# Patient Record
Sex: MALE | Race: WHITE
[De-identification: names, ages, dates, MRNs, and addresses within clinical notes are randomized per-mention and may not be internally consistent; named-entity substitution may affect disease eponyms.]

---

## 2019-06-26 ENCOUNTER — APPOINTMENT (OUTPATIENT)
Dept: CARDIOLOGY | Facility: CLINIC | Age: 69
End: 2019-06-26
Payer: COMMERCIAL

## 2019-06-26 VITALS
BODY MASS INDEX: 36.12 KG/M2 | HEART RATE: 18 BPM | WEIGHT: 258 LBS | OXYGEN SATURATION: 96 % | TEMPERATURE: 98.7 F | RESPIRATION RATE: 16 BRPM | HEIGHT: 71 IN

## 2019-06-26 PROCEDURE — 99214 OFFICE O/P EST MOD 30 MIN: CPT | Mod: 25

## 2019-06-26 PROCEDURE — 93000 ELECTROCARDIOGRAM COMPLETE: CPT

## 2019-07-08 ENCOUNTER — RECORD ABSTRACTING (OUTPATIENT)
Age: 69
End: 2019-07-08

## 2019-07-08 RX ORDER — ASPIRIN 81 MG/1
81 TABLET, CHEWABLE ORAL
Qty: 15 | Refills: 0 | Status: ACTIVE | COMMUNITY

## 2019-12-05 ENCOUNTER — NON-APPOINTMENT (OUTPATIENT)
Age: 69
End: 2019-12-05

## 2019-12-05 ENCOUNTER — APPOINTMENT (OUTPATIENT)
Dept: CARDIOLOGY | Facility: CLINIC | Age: 69
End: 2019-12-05
Payer: COMMERCIAL

## 2019-12-05 VITALS — OXYGEN SATURATION: 97 % | BODY MASS INDEX: 35.98 KG/M2 | HEART RATE: 80 BPM | HEIGHT: 71 IN | WEIGHT: 257 LBS

## 2019-12-05 VITALS — SYSTOLIC BLOOD PRESSURE: 198 MMHG | DIASTOLIC BLOOD PRESSURE: 96 MMHG

## 2019-12-05 PROCEDURE — 93000 ELECTROCARDIOGRAM COMPLETE: CPT

## 2019-12-05 PROCEDURE — 99214 OFFICE O/P EST MOD 30 MIN: CPT

## 2019-12-05 RX ORDER — DILTIAZEM HYDROCHLORIDE 180 MG/1
180 CAPSULE, EXTENDED RELEASE ORAL
Qty: 90 | Refills: 3 | Status: ACTIVE | COMMUNITY
Start: 2019-12-05 | End: 1900-01-01

## 2019-12-05 RX ORDER — DILTIAZEM HYDROCHLORIDE 180 MG/1
180 CAPSULE, COATED, EXTENDED RELEASE ORAL DAILY
Refills: 0 | Status: DISCONTINUED | COMMUNITY
End: 2019-12-05

## 2019-12-05 RX ORDER — FINASTERIDE 5 MG/1
5 TABLET, FILM COATED ORAL
Refills: 0 | Status: DISCONTINUED | COMMUNITY
End: 2019-12-05

## 2019-12-06 NOTE — DISCUSSION/SUMMARY
[Moderate Aortic Stenosis] : moderate aortic stenosis [FreeTextEntry1] : Echo will be ordered to evaluate valvular disease.

## 2019-12-06 NOTE — HISTORY OF PRESENT ILLNESS
[FreeTextEntry1] : is a 69-year-old patient who presents for cardiac followup. Patient is a former patient of Dr. Yoandy Castellano. He has a history of aortic stenosis carotid artery stenosis diabetes mellitus hypertension obesity hyperlipidemia and shortness of breath. Patient denies chest pain shortness of breath at this time.

## 2019-12-06 NOTE — PHYSICAL EXAM
[General Appearance - Well Developed] : well developed [Normal Appearance] : normal appearance [General Appearance - Well Nourished] : well nourished [No Deformities] : no deformities [Well Groomed] : well groomed [Normal Conjunctiva] : the conjunctiva exhibited no abnormalities [General Appearance - In No Acute Distress] : no acute distress [Eyelids - No Xanthelasma] : the eyelids demonstrated no xanthelasmas [Normal Oral Mucosa] : normal oral mucosa [No Oral Cyanosis] : no oral cyanosis [No Oral Pallor] : no oral pallor [Normal Jugular Venous V Waves Present] : normal jugular venous V waves present [Normal Jugular Venous A Waves Present] : normal jugular venous A waves present [No Jugular Venous [place] A Waves] : no jugular venous [place] A waves [Exaggerated Use Of Accessory Muscles For Inspiration] : no accessory muscle use [Respiration, Rhythm And Depth] : normal respiratory rhythm and effort [Heart Rate And Rhythm] : heart rate and rhythm were normal [Auscultation Breath Sounds / Voice Sounds] : lungs were clear to auscultation bilaterally [Heart Sounds] : normal S1 and S2 [Murmurs] : no murmurs present [Abdomen Soft] : soft [Abdomen Tenderness] : non-tender [Abnormal Walk] : normal gait [Abdomen Mass (___ Cm)] : no abdominal mass palpated [Nail Clubbing] : no clubbing of the fingernails [Gait - Sufficient For Exercise Testing] : the gait was sufficient for exercise testing [Cyanosis, Localized] : no localized cyanosis [Petechial Hemorrhages (___cm)] : no petechial hemorrhages [Skin Color & Pigmentation] : normal skin color and pigmentation [] : no rash [No Venous Stasis] : no venous stasis [No Skin Ulcers] : no skin ulcer [Skin Lesions] : no skin lesions [Oriented To Time, Place, And Person] : oriented to person, place, and time [No Xanthoma] : no  xanthoma was observed [Mood] : the mood was normal [Affect] : the affect was normal [No Anxiety] : not feeling anxious

## 2019-12-24 ENCOUNTER — APPOINTMENT (OUTPATIENT)
Dept: CARDIOLOGY | Facility: CLINIC | Age: 69
End: 2019-12-24
Payer: COMMERCIAL

## 2019-12-24 PROCEDURE — 93306 TTE W/DOPPLER COMPLETE: CPT

## 2020-01-06 ENCOUNTER — APPOINTMENT (OUTPATIENT)
Dept: CARDIOLOGY | Facility: CLINIC | Age: 70
End: 2020-01-06
Payer: COMMERCIAL

## 2020-01-06 VITALS
HEIGHT: 61 IN | BODY MASS INDEX: 48.52 KG/M2 | WEIGHT: 257 LBS | OXYGEN SATURATION: 96 % | HEART RATE: 58 BPM | DIASTOLIC BLOOD PRESSURE: 88 MMHG | SYSTOLIC BLOOD PRESSURE: 160 MMHG

## 2020-01-06 DIAGNOSIS — I10 ESSENTIAL (PRIMARY) HYPERTENSION: ICD-10-CM

## 2020-01-06 PROCEDURE — 99214 OFFICE O/P EST MOD 30 MIN: CPT | Mod: 25

## 2020-01-06 PROCEDURE — 93000 ELECTROCARDIOGRAM COMPLETE: CPT

## 2020-01-06 NOTE — REASON FOR VISIT
[Follow-Up - Clinic] : a clinic follow-up of [Hypertension] : hypertension [Hyperlipidemia] : hyperlipidemia [Medication Management] : Medication management

## 2020-01-07 DIAGNOSIS — Z00.00 ENCOUNTER FOR GENERAL ADULT MEDICAL EXAMINATION W/OUT ABNORMAL FINDINGS: ICD-10-CM

## 2020-01-07 LAB
25(OH)D3 SERPL-MCNC: 17.4 NG/ML
ALBUMIN SERPL ELPH-MCNC: 4.6 G/DL
ALP BLD-CCNC: 54 U/L
ALT SERPL-CCNC: 48 U/L
ANION GAP SERPL CALC-SCNC: 16 MMOL/L
AST SERPL-CCNC: 42 U/L
BASOPHILS # BLD AUTO: 0.02 K/UL
BASOPHILS NFR BLD AUTO: 0.3 %
BILIRUB SERPL-MCNC: 0.6 MG/DL
BUN SERPL-MCNC: 12 MG/DL
CALCIUM SERPL-MCNC: 10.2 MG/DL
CHLORIDE SERPL-SCNC: 100 MMOL/L
CHOLEST SERPL-MCNC: 233 MG/DL
CHOLEST/HDLC SERPL: 3.8 RATIO
CK SERPL-CCNC: 148 U/L
CO2 SERPL-SCNC: 24 MMOL/L
CREAT SERPL-MCNC: 0.83 MG/DL
EOSINOPHIL # BLD AUTO: 0.21 K/UL
EOSINOPHIL NFR BLD AUTO: 3.3 %
ESTIMATED AVERAGE GLUCOSE: 134 MG/DL
GLUCOSE SERPL-MCNC: 172 MG/DL
HBA1C MFR BLD HPLC: 6.3 %
HCT VFR BLD CALC: 41.5 %
HDLC SERPL-MCNC: 61 MG/DL
HGB BLD-MCNC: 13.8 G/DL
IMM GRANULOCYTES NFR BLD AUTO: 0.3 %
LDLC SERPL CALC-MCNC: 151 MG/DL
LYMPHOCYTES # BLD AUTO: 1.78 K/UL
LYMPHOCYTES NFR BLD AUTO: 27.9 %
MAGNESIUM SERPL-MCNC: 1.7 MG/DL
MAN DIFF?: NORMAL
MCHC RBC-ENTMCNC: 32.9 PG
MCHC RBC-ENTMCNC: 33.3 GM/DL
MCV RBC AUTO: 99 FL
MONOCYTES # BLD AUTO: 0.61 K/UL
MONOCYTES NFR BLD AUTO: 9.5 %
NEUTROPHILS # BLD AUTO: 3.75 K/UL
NEUTROPHILS NFR BLD AUTO: 58.7 %
PLATELET # BLD AUTO: 183 K/UL
POTASSIUM SERPL-SCNC: 4.8 MMOL/L
PROT SERPL-MCNC: 6.5 G/DL
PSA SERPL-MCNC: 2.88 NG/ML
RBC # BLD: 4.19 M/UL
RBC # FLD: 13.2 %
SODIUM SERPL-SCNC: 140 MMOL/L
T3 SERPL-MCNC: 120 NG/DL
T4 SERPL-MCNC: 6.1 UG/DL
TRIGL SERPL-MCNC: 104 MG/DL
TSH SERPL-ACNC: 2.27 UIU/ML
WBC # FLD AUTO: 6.39 K/UL

## 2020-01-09 NOTE — PHYSICAL EXAM
[General Appearance - Well Developed] : well developed [Normal Appearance] : normal appearance [General Appearance - Well Nourished] : well nourished [Well Groomed] : well groomed [No Deformities] : no deformities [Normal Conjunctiva] : the conjunctiva exhibited no abnormalities [General Appearance - In No Acute Distress] : no acute distress [] : no respiratory distress [Respiration, Rhythm And Depth] : normal respiratory rhythm and effort [Auscultation Breath Sounds / Voice Sounds] : lungs were clear to auscultation bilaterally [Exaggerated Use Of Accessory Muscles For Inspiration] : no accessory muscle use [Heart Rate And Rhythm] : heart rate and rhythm were normal [Heart Sounds] : normal S1 and S2 [Abdomen Soft] : soft [Skin Color & Pigmentation] : normal skin color and pigmentation [Abnormal Walk] : normal gait [Oriented To Time, Place, And Person] : oriented to person, place, and time [FreeTextEntry1] : No LE Edema

## 2020-01-09 NOTE — DISCUSSION/SUMMARY
[FreeTextEntry1] : HTN: Although improved from prior visit still remains elevated.  At this time will increase losartan as directed with BP check 2-3 weeks advised low NA diet, weight control. Labs ordered ( No know renal disease ) \par \par DM: Medical management with PCP\par \par LVH: Moderate by Echo will monitor yearly\par \par Carotid disease: Mild continue ASA/Statin\par \par With above cardiac risk factors of DM/HTN/HLD and obesity will obtain lexiscan stress test ( Unable to walk on treadmill knee issues ) this to be done when pressure controlled \par \par

## 2020-01-09 NOTE — HISTORY OF PRESENT ILLNESS
[FreeTextEntry1] : is a 69-year-old patient who presents to discuss testing and blood pressure check.  Patient is a former patient of Dr. Yoandy Castellano. He has a history of aortic stenosis carotid artery stenosis diabetes mellitus hypertension obesity hyperlipidemia.  Patient denies chest pain shortness of breath at this time.\par \par Has resumed all prior BP meds 4 weeks ago

## 2020-01-27 ENCOUNTER — APPOINTMENT (OUTPATIENT)
Dept: CARDIOLOGY | Facility: CLINIC | Age: 70
End: 2020-01-27

## 2020-02-03 ENCOUNTER — APPOINTMENT (OUTPATIENT)
Dept: CARDIOLOGY | Facility: CLINIC | Age: 70
End: 2020-02-03

## 2020-02-05 ENCOUNTER — RX RENEWAL (OUTPATIENT)
Age: 70
End: 2020-02-05

## 2020-02-06 ENCOUNTER — APPOINTMENT (OUTPATIENT)
Dept: CARDIOLOGY | Facility: CLINIC | Age: 70
End: 2020-02-06
Payer: COMMERCIAL

## 2020-02-06 VITALS
HEIGHT: 61 IN | SYSTOLIC BLOOD PRESSURE: 160 MMHG | BODY MASS INDEX: 48.52 KG/M2 | HEART RATE: 63 BPM | DIASTOLIC BLOOD PRESSURE: 89 MMHG | OXYGEN SATURATION: 95 % | WEIGHT: 257 LBS | RESPIRATION RATE: 16 BRPM

## 2020-02-06 PROCEDURE — 93000 ELECTROCARDIOGRAM COMPLETE: CPT

## 2020-02-06 PROCEDURE — 99214 OFFICE O/P EST MOD 30 MIN: CPT | Mod: 25

## 2020-02-06 NOTE — PHYSICAL EXAM
[General Appearance - Well Developed] : well developed [Normal Appearance] : normal appearance [Well Groomed] : well groomed [General Appearance - In No Acute Distress] : no acute distress [No Deformities] : no deformities [General Appearance - Well Nourished] : well nourished [Normal Conjunctiva] : the conjunctiva exhibited no abnormalities [Respiration, Rhythm And Depth] : normal respiratory rhythm and effort [] : no respiratory distress [Exaggerated Use Of Accessory Muscles For Inspiration] : no accessory muscle use [Auscultation Breath Sounds / Voice Sounds] : lungs were clear to auscultation bilaterally [Heart Rate And Rhythm] : heart rate and rhythm were normal [Heart Sounds] : normal S1 and S2 [Abnormal Walk] : normal gait [Abdomen Soft] : soft [Skin Color & Pigmentation] : normal skin color and pigmentation [Oriented To Time, Place, And Person] : oriented to person, place, and time [FreeTextEntry1] : No LE Edema

## 2020-02-06 NOTE — DISCUSSION/SUMMARY
[FreeTextEntry1] : HTN: Adequately controlled on current medications.  Advised US Aorta \par \par DM: Medical management with PCP\par \par LVH: Moderate by Echo will monitor yearly\par \par Carotid disease: Mild continue ASA/Statin\par \par Overweight: Advised weight reduction and sleep study \par \par With above cardiac risk factors of DM/HTN/HLD and obesity will obtain lexiscan stress test ( Unable to walk on treadmill knee issues ) this to be done when pressure controlled \par \par OV after testing \par

## 2020-02-06 NOTE — REASON FOR VISIT
[Hyperlipidemia] : hyperlipidemia [Follow-Up - Clinic] : a clinic follow-up of [Medication Management] : Medication management [Hypertension] : hypertension

## 2020-02-10 ENCOUNTER — RX RENEWAL (OUTPATIENT)
Age: 70
End: 2020-02-10

## 2020-02-10 RX ORDER — ERGOCALCIFEROL 1.25 MG/1
1.25 MG CAPSULE, LIQUID FILLED ORAL
Qty: 12 | Refills: 0 | Status: ACTIVE | COMMUNITY
Start: 2020-01-07 | End: 1900-01-01

## 2020-03-12 ENCOUNTER — APPOINTMENT (OUTPATIENT)
Dept: CARDIOLOGY | Facility: CLINIC | Age: 70
End: 2020-03-12

## 2020-04-23 ENCOUNTER — APPOINTMENT (OUTPATIENT)
Dept: CARDIOLOGY | Facility: CLINIC | Age: 70
End: 2020-04-23

## 2020-04-28 ENCOUNTER — APPOINTMENT (OUTPATIENT)
Dept: CARDIOLOGY | Facility: CLINIC | Age: 70
End: 2020-04-28

## 2020-05-26 ENCOUNTER — RX RENEWAL (OUTPATIENT)
Age: 70
End: 2020-05-26

## 2020-06-02 ENCOUNTER — APPOINTMENT (OUTPATIENT)
Dept: CARDIOLOGY | Facility: CLINIC | Age: 70
End: 2020-06-02

## 2020-06-09 ENCOUNTER — APPOINTMENT (OUTPATIENT)
Dept: CARDIOLOGY | Facility: CLINIC | Age: 70
End: 2020-06-09

## 2020-06-11 ENCOUNTER — APPOINTMENT (OUTPATIENT)
Dept: CARDIOLOGY | Facility: CLINIC | Age: 70
End: 2020-06-11

## 2020-06-17 ENCOUNTER — APPOINTMENT (OUTPATIENT)
Dept: CARDIOLOGY | Facility: CLINIC | Age: 70
End: 2020-06-17

## 2020-10-15 ENCOUNTER — RX RENEWAL (OUTPATIENT)
Age: 70
End: 2020-10-15

## 2020-10-15 RX ORDER — LOSARTAN POTASSIUM 100 MG/1
100 TABLET, FILM COATED ORAL DAILY
Qty: 90 | Refills: 1 | Status: ACTIVE | COMMUNITY
Start: 2020-02-05 | End: 1900-01-01

## 2020-11-06 ENCOUNTER — RX CHANGE (OUTPATIENT)
Age: 70
End: 2020-11-06

## 2020-11-06 RX ORDER — ATORVASTATIN CALCIUM 10 MG/1
10 TABLET, FILM COATED ORAL DAILY
Qty: 30 | Refills: 3 | Status: DISCONTINUED | COMMUNITY
Start: 2020-01-06 | End: 2020-11-06

## 2020-11-06 RX ORDER — ATORVASTATIN CALCIUM 10 MG/1
10 TABLET, FILM COATED ORAL DAILY
Qty: 90 | Refills: 2 | Status: ACTIVE | COMMUNITY
Start: 2020-11-06 | End: 1900-01-01

## 2020-11-18 ENCOUNTER — TRANSCRIPTION ENCOUNTER (OUTPATIENT)
Age: 70
End: 2020-11-18

## 2020-12-04 ENCOUNTER — RX RENEWAL (OUTPATIENT)
Age: 70
End: 2020-12-04

## 2020-12-19 ENCOUNTER — OUTPATIENT (OUTPATIENT)
Dept: OUTPATIENT SERVICES | Facility: HOSPITAL | Age: 70
LOS: 1 days | End: 2020-12-19
Payer: COMMERCIAL

## 2020-12-19 DIAGNOSIS — Z20.828 CONTACT WITH AND (SUSPECTED) EXPOSURE TO OTHER VIRAL COMMUNICABLE DISEASES: ICD-10-CM

## 2020-12-19 PROCEDURE — U0003: CPT

## 2020-12-20 DIAGNOSIS — Z20.828 CONTACT WITH AND (SUSPECTED) EXPOSURE TO OTHER VIRAL COMMUNICABLE DISEASES: ICD-10-CM

## 2020-12-20 LAB — SARS-COV-2 RNA SPEC QL NAA+PROBE: SIGNIFICANT CHANGE UP

## 2020-12-22 ENCOUNTER — RX RENEWAL (OUTPATIENT)
Age: 70
End: 2020-12-22

## 2020-12-22 RX ORDER — LABETALOL HYDROCHLORIDE 300 MG/1
300 TABLET, FILM COATED ORAL
Qty: 360 | Refills: 3 | Status: ACTIVE | COMMUNITY
Start: 2020-12-04 | End: 1900-01-01

## 2020-12-28 ENCOUNTER — RX RENEWAL (OUTPATIENT)
Age: 70
End: 2020-12-28

## 2021-10-06 PROBLEM — I10 ESSENTIAL HYPERTENSION: Status: ACTIVE | Noted: 2019-12-05

## 2022-02-14 ENCOUNTER — RX RENEWAL (OUTPATIENT)
Age: 72
End: 2022-02-14

## 2022-02-14 RX ORDER — DILTIAZEM HYDROCHLORIDE 180 MG/1
180 CAPSULE, EXTENDED RELEASE ORAL
Qty: 90 | Refills: 3 | Status: ACTIVE | COMMUNITY
Start: 2020-12-28 | End: 1900-01-01

## 2022-03-22 ENCOUNTER — EMERGENCY (EMERGENCY)
Facility: HOSPITAL | Age: 72
LOS: 0 days | Discharge: HOME | End: 2022-03-22
Attending: EMERGENCY MEDICINE
Payer: COMMERCIAL

## 2022-03-22 VITALS
SYSTOLIC BLOOD PRESSURE: 136 MMHG | TEMPERATURE: 98 F | OXYGEN SATURATION: 97 % | DIASTOLIC BLOOD PRESSURE: 90 MMHG | HEART RATE: 81 BPM | RESPIRATION RATE: 18 BRPM

## 2022-03-22 VITALS
TEMPERATURE: 98 F | SYSTOLIC BLOOD PRESSURE: 147 MMHG | DIASTOLIC BLOOD PRESSURE: 87 MMHG | WEIGHT: 248.02 LBS | HEIGHT: 71 IN | OXYGEN SATURATION: 98 % | HEART RATE: 146 BPM | RESPIRATION RATE: 18 BRPM

## 2022-03-22 DIAGNOSIS — E11.9 TYPE 2 DIABETES MELLITUS WITHOUT COMPLICATIONS: ICD-10-CM

## 2022-03-22 DIAGNOSIS — L02.414 CUTANEOUS ABSCESS OF LEFT UPPER LIMB: ICD-10-CM

## 2022-03-22 DIAGNOSIS — I10 ESSENTIAL (PRIMARY) HYPERTENSION: ICD-10-CM

## 2022-03-22 DIAGNOSIS — E78.5 HYPERLIPIDEMIA, UNSPECIFIED: ICD-10-CM

## 2022-03-22 PROCEDURE — 87075 CULTR BACTERIA EXCEPT BLOOD: CPT

## 2022-03-22 PROCEDURE — 87077 CULTURE AEROBIC IDENTIFY: CPT

## 2022-03-22 PROCEDURE — 87186 SC STD MICRODIL/AGAR DIL: CPT

## 2022-03-22 PROCEDURE — 87070 CULTURE OTHR SPECIMN AEROBIC: CPT

## 2022-03-22 PROCEDURE — 99284 EMERGENCY DEPT VISIT MOD MDM: CPT | Mod: 25

## 2022-03-22 PROCEDURE — 99283 EMERGENCY DEPT VISIT LOW MDM: CPT | Mod: 25

## 2022-03-22 PROCEDURE — 10060 I&D ABSCESS SIMPLE/SINGLE: CPT

## 2022-03-22 PROCEDURE — 10061 I&D ABSCESS COMP/MULTIPLE: CPT

## 2022-03-22 RX ORDER — ACETAMINOPHEN 500 MG
1000 TABLET ORAL ONCE
Refills: 0 | Status: COMPLETED | OUTPATIENT
Start: 2022-03-22 | End: 2022-03-22

## 2022-03-22 RX ADMIN — Medication 1000 MILLIGRAM(S): at 10:19

## 2022-03-22 NOTE — ED PROVIDER NOTE - OBJECTIVE STATEMENT
70 y/o M w/ PMHx of HTN, DM HLD, presents to ED c/o abscess on L armpit started w/ symptom on 3/18. Pt went to see doctors at [facility] and was started on antibiotics. Pt reports streaking redness improved however when he f/u w/ doctors they suggested he to come to ED for I&D. No travel, no sick contacts. Pt has had abscess on that arm before.

## 2022-03-22 NOTE — ED PROCEDURE NOTE - PROCEDURE ADDITIONAL DETAILS
After cleaning area with Betadine, I anesthetized locally with Lido and Epi.  Then I made an incision and drained large amount of pus and bloody fluid.  I broke up loculations and irrigated the area copiously.  Placed packing and dressed wound.  SANDI ordonez PA-C

## 2022-03-22 NOTE — ED ADULT NURSE NOTE - OBJECTIVE STATEMENT
c/o left axillary abscess started 3 days ago, redness to area streaking down left arm, c/o discomfort at site, denies fever HX: HTN, PCP (dr Boxer) told pt yesterday to come to the hospital for evaluation

## 2022-03-22 NOTE — ED PROVIDER NOTE - SKIN, MLM
3x4 cm abscess L proximal armpit close to auxiliary no streaking redness down arm. Pt w/ marker yisel on arm no redness around marked areas distal n/v/m intact 3x4 cm abscess L proximal armpit close to auxiliary no streaking redness down arm. Pt w/ marker yisel fro axilla to forearm and no redness around marked areas distal n/v/m intact

## 2022-03-22 NOTE — ED PROVIDER NOTE - PATIENT PORTAL LINK FT
You can access the FollowMyHealth Patient Portal offered by [facility] by registering at the following website: [email or web address]. By joining [facility]’s FollowMyHealth portal, you will also be able to view your health information using other applications (apps) compatible with our system.

## 2022-03-22 NOTE — ED PROVIDER NOTE - PROGRESS NOTE DETAILS
Milad HOYOS for ED attending, Dr. Owusu: Pt HR improved to 96 after I&D. Ms. Matthews called to set up pt to see surgeon and f/u. Pt instructed to finished antibiotics and either return to ED or see surgeon on Thursday for wound check. Attending Owusu, pt set up for surgery follow up for tomorrow.

## 2022-03-22 NOTE — ED ADULT TRIAGE NOTE - CHIEF COMPLAINT QUOTE
c/o left axillary abscess started 3 days ago, redness to area streaking down left arm, c/o discomfort at site, denies fever HX: HTN

## 2022-03-22 NOTE — ED PROVIDER NOTE - NSFOLLOWUPINSTRUCTIONS_ED_ALL_ED_FT
Please call and follow up with your doctor in 2 days for a wound check or return to the emergency department.    Finish the antibiotics prescribed to you.    Return to the Emergency Department for worsening or persistent symptoms, and/or ANY NEW OR CONCERNING SYMPTOMS. If you have issues obtaining follow up, please call: [phone] or [phone]  to obtain a doctor or specialist who takes your insurance in your area.    Abscess    WHAT YOU NEED TO KNOW:    A warm compress may help your abscess drain. Your healthcare provider may make a cut in the abscess so it can drain. You may need surgery to remove an abscess that is on your hands or buttocks.  Skin Abscess    DISCHARGE INSTRUCTIONS:    Return to the emergency department if:    The area around your abscess becomes very painful, warm, or has red streaks.    You have a fever and chills.    Your heart is beating faster than usual.    You feel faint or confused.  Call your doctor if:    Your abscess gets bigger or does not get better.    Your abscess returns.    You have questions or concerns about your condition or care.  Medicines: You may need any of the following:    Antibiotics help treat a bacterial infection.    Acetaminophen decreases pain and fever. It is available without a doctor's order. Ask how much to take and how often to take it. Follow directions. Read the labels of all other medicines you are using to see if they also contain acetaminophen, or ask your doctor or pharmacist. Acetaminophen can cause liver damage if not taken correctly. Do not use more than 4 grams (4,000 milligrams) total of acetaminophen in one day.    NSAIDs, such as ibuprofen, help decrease swelling, pain, and fever. This medicine is available with or without a doctor's order. NSAIDs can cause stomach bleeding or kidney problems in certain people. If you take blood thinner medicine, always ask your healthcare provider if NSAIDs are safe for you. Always read the medicine label and follow directions.    Take your medicine as directed. Contact your healthcare provider if you think your medicine is not helping or if you have side effects. Tell him or her if you are allergic to any medicine. Keep a list of the medicines, vitamins, and herbs you take. Include the amounts, and when and why you take them. Bring the list or the pill bottles to follow-up visits. Carry your medicine list with you in case of an emergency.  Self-care:    Apply a warm compress to your abscess. This will help it open and drain. Wet a washcloth in warm, but not hot, water. Apply the compress for 10 minutes. Repeat this 4 times each day. Do not press on an abscess or try to open it with a needle. You may push the bacteria deeper or into your blood.    Do not share your clothes, towels, or sheets with anyone. This can spread the infection to others.    Wash your hands often. This can help prevent the spread of germs. Use soap and water or an alcohol-based hand rub.  Handwashing  Care for your wound after it is drained:    Care for your wound as directed. If your healthcare provider says it is okay, carefully remove the bandage and gauze packing. You may need to soak the gauze to get it out of your wound. Clean your wound and the area around it as directed. Dry the area and put on new, clean bandages. Change your bandages when they get wet or dirty.    Ask your healthcare provider how to change the gauze in your wound. Keep track of how many pieces of gauze are placed inside the wound. Do not put too much packing in the wound. Do not pack the gauze too tightly in your wound.  Follow up with your healthcare provider in 1 to 3 days: You may need to have your packing removed or your bandage changed. Write down your questions so you remember to ask them during your visits.    Incision and Drainage    WHAT YOU NEED TO KNOW:    Incision and drainage is a procedure to drain a pocket of fluid, such as pus or blood.    DISCHARGE INSTRUCTIONS:    Call your doctor if:    You have red streaks or extreme pain near your wound.    Blood soaks through your bandage.    You have pain in your back, stomach, muscles, or joints.    You have fever or chills.    You feel more tired than usual.    Fluid builds up again and creates a pocket in the same area.    Your wound becomes red, swollen, and painful.    You have questions or concerns about your condition or care.  Medicines: You may need any of the following:    NSAIDs, such as ibuprofen, help decrease swelling, pain, and fever. This medicine is available with or without a doctor's order. NSAIDs can cause stomach bleeding or kidney problems in certain people. If you take blood thinner medicine, always ask if NSAIDs are safe for you. Always read the medicine label and follow directions. Do not give these medicines to children under 6 months of age without direction from your child's healthcare provider.    Prescription pain medicine may be given. Ask your healthcare provider how to take this medicine safely. Some prescription pain medicines contain acetaminophen. Do not take other medicines that contain acetaminophen without talking to your healthcare provider. Too much acetaminophen may cause liver damage. Prescription pain medicine may cause constipation. Ask your healthcare provider how to prevent or treat constipation.    Take your medicine as directed. Contact your healthcare provider if you think your medicine is not helping or if you have side effects. Tell him or her if you are allergic to any medicine. Keep a list of the medicines, vitamins, and herbs you take. Include the amounts, and when and why you take them. Bring the list or the pill bottles to follow-up visits. Carry your medicine list with you in case of an emergency.  Wound care: Keep your wound clean and dry as directed by your healthcare provider:    Wash your hands before and after you touch or clean your wound.  Handwashing      Flush or soak your wound as directed.    Check your wound for signs of infection, such as redness, swelling, and pus.    Change the packing or bandages as directed. Ask for more information about what type of bandages to use.  Elevate your wound: If your wound is on your arm or leg, keep it raised above the level of your heart as often as you can. This will help decrease swelling and pain. Prop your arm or leg on pillows or blankets to keep it elevated comfortably.    Wear a splint as directed: You may need to wear a splint if your wound is on your hand, arm, or leg. A splint limits movement and helps your wound heal. Do not remove the splint until your healthcare provider says it is okay.    Follow up with your doctor in 1 to 3 days, or as directed: You may need to return to have your incision cleaned and your bandages changed. Bring a list of your questions so you remember to ask them during your visits. Please call and follow up with surgeon tomorrow.    Finish the antibiotics prescribed to you.    Return to the Emergency Department for worsening or persistent symptoms, and/or ANY NEW OR CONCERNING SYMPTOMS. If you have issues obtaining follow up, please call: [phone] or [phone]  to obtain a doctor or specialist who takes your insurance in your area.    Abscess    WHAT YOU NEED TO KNOW:    A warm compress may help your abscess drain. Your healthcare provider may make a cut in the abscess so it can drain. You may need surgery to remove an abscess that is on your hands or buttocks.  Skin Abscess    DISCHARGE INSTRUCTIONS:    Return to the emergency department if:    The area around your abscess becomes very painful, warm, or has red streaks.    You have a fever and chills.    Your heart is beating faster than usual.    You feel faint or confused.  Call your doctor if:    Your abscess gets bigger or does not get better.    Your abscess returns.    You have questions or concerns about your condition or care.  Medicines: You may need any of the following:    Antibiotics help treat a bacterial infection.    Acetaminophen decreases pain and fever. It is available without a doctor's order. Ask how much to take and how often to take it. Follow directions. Read the labels of all other medicines you are using to see if they also contain acetaminophen, or ask your doctor or pharmacist. Acetaminophen can cause liver damage if not taken correctly. Do not use more than 4 grams (4,000 milligrams) total of acetaminophen in one day.    NSAIDs, such as ibuprofen, help decrease swelling, pain, and fever. This medicine is available with or without a doctor's order. NSAIDs can cause stomach bleeding or kidney problems in certain people. If you take blood thinner medicine, always ask your healthcare provider if NSAIDs are safe for you. Always read the medicine label and follow directions.    Take your medicine as directed. Contact your healthcare provider if you think your medicine is not helping or if you have side effects. Tell him or her if you are allergic to any medicine. Keep a list of the medicines, vitamins, and herbs you take. Include the amounts, and when and why you take them. Bring the list or the pill bottles to follow-up visits. Carry your medicine list with you in case of an emergency.  Self-care:    Apply a warm compress to your abscess. This will help it open and drain. Wet a washcloth in warm, but not hot, water. Apply the compress for 10 minutes. Repeat this 4 times each day. Do not press on an abscess or try to open it with a needle. You may push the bacteria deeper or into your blood.    Do not share your clothes, towels, or sheets with anyone. This can spread the infection to others.    Wash your hands often. This can help prevent the spread of germs. Use soap and water or an alcohol-based hand rub.  Handwashing  Care for your wound after it is drained:    Care for your wound as directed. If your healthcare provider says it is okay, carefully remove the bandage and gauze packing. You may need to soak the gauze to get it out of your wound. Clean your wound and the area around it as directed. Dry the area and put on new, clean bandages. Change your bandages when they get wet or dirty.    Ask your healthcare provider how to change the gauze in your wound. Keep track of how many pieces of gauze are placed inside the wound. Do not put too much packing in the wound. Do not pack the gauze too tightly in your wound.  Follow up with your healthcare provider in 1 to 3 days: You may need to have your packing removed or your bandage changed. Write down your questions so you remember to ask them during your visits.    Incision and Drainage    WHAT YOU NEED TO KNOW:    Incision and drainage is a procedure to drain a pocket of fluid, such as pus or blood.    DISCHARGE INSTRUCTIONS:    Call your doctor if:    You have red streaks or extreme pain near your wound.    Blood soaks through your bandage.    You have pain in your back, stomach, muscles, or joints.    You have fever or chills.    You feel more tired than usual.    Fluid builds up again and creates a pocket in the same area.    Your wound becomes red, swollen, and painful.    You have questions or concerns about your condition or care.  Medicines: You may need any of the following:    NSAIDs, such as ibuprofen, help decrease swelling, pain, and fever. This medicine is available with or without a doctor's order. NSAIDs can cause stomach bleeding or kidney problems in certain people. If you take blood thinner medicine, always ask if NSAIDs are safe for you. Always read the medicine label and follow directions. Do not give these medicines to children under 6 months of age without direction from your child's healthcare provider.    Prescription pain medicine may be given. Ask your healthcare provider how to take this medicine safely. Some prescription pain medicines contain acetaminophen. Do not take other medicines that contain acetaminophen without talking to your healthcare provider. Too much acetaminophen may cause liver damage. Prescription pain medicine may cause constipation. Ask your healthcare provider how to prevent or treat constipation.    Take your medicine as directed. Contact your healthcare provider if you think your medicine is not helping or if you have side effects. Tell him or her if you are allergic to any medicine. Keep a list of the medicines, vitamins, and herbs you take. Include the amounts, and when and why you take them. Bring the list or the pill bottles to follow-up visits. Carry your medicine list with you in case of an emergency.  Wound care: Keep your wound clean and dry as directed by your healthcare provider:    Wash your hands before and after you touch or clean your wound.  Handwashing      Flush or soak your wound as directed.    Check your wound for signs of infection, such as redness, swelling, and pus.    Change the packing or bandages as directed. Ask for more information about what type of bandages to use.  Elevate your wound: If your wound is on your arm or leg, keep it raised above the level of your heart as often as you can. This will help decrease swelling and pain. Prop your arm or leg on pillows or blankets to keep it elevated comfortably.    Wear a splint as directed: You may need to wear a splint if your wound is on your hand, arm, or leg. A splint limits movement and helps your wound heal. Do not remove the splint until your healthcare provider says it is okay.    Follow up with your doctor in 1 to 3 days, or as directed: You may need to return to have your incision cleaned and your bandages changed. Bring a list of your questions so you remember to ask them during your visits.

## 2022-03-23 ENCOUNTER — APPOINTMENT (OUTPATIENT)
Dept: SURGERY | Facility: CLINIC | Age: 72
End: 2022-03-23
Payer: COMMERCIAL

## 2022-03-23 VITALS
WEIGHT: 248 LBS | HEIGHT: 71 IN | OXYGEN SATURATION: 96 % | RESPIRATION RATE: 15 BRPM | HEART RATE: 66 BPM | DIASTOLIC BLOOD PRESSURE: 72 MMHG | BODY MASS INDEX: 34.72 KG/M2 | SYSTOLIC BLOOD PRESSURE: 140 MMHG | TEMPERATURE: 98.4 F

## 2022-03-23 PROBLEM — E78.5 HYPERLIPIDEMIA, UNSPECIFIED: Chronic | Status: ACTIVE | Noted: 2022-03-22

## 2022-03-23 PROBLEM — I10 ESSENTIAL (PRIMARY) HYPERTENSION: Chronic | Status: ACTIVE | Noted: 2022-03-22

## 2022-03-23 PROBLEM — E11.9 TYPE 2 DIABETES MELLITUS WITHOUT COMPLICATIONS: Chronic | Status: ACTIVE | Noted: 2022-03-22

## 2022-03-23 PROCEDURE — 99203 OFFICE O/P NEW LOW 30 MIN: CPT

## 2022-03-23 NOTE — PHYSICAL EXAM
[JVD] : no jugular venous distention  [Normal Breath Sounds] : Normal breath sounds [Normal Heart Sounds] : normal heart sounds [Alert] : alert [Oriented to Person] : oriented to person [Oriented to Place] : oriented to place [Oriented to Time] : oriented to time [de-identified] : NAD [de-identified] : soft, Non tender. [de-identified] : L ax cyst I&D site - infected cyst. Healing well.

## 2022-03-24 NOTE — ED POST DISCHARGE NOTE - DETAILS
Called and left VM to call back the ER. -Sujit Gonzalez PA-C Pt unsure of which antibiotic he was taking previously. States he feels well, has follow up with Dr. Giraldo next week for reassessment. Agreed to hold additional antibiotics at this time, complete current course. Return precautions provided. - Peng Latif PA-C Pt called back about letter, is on cefadroxil. Says infection is much better and he is seeing Dr Giraldo tomorrow. REcommend pt finish abx. Pt agrees with plan of  care. signed Janki Roche PA-C

## 2022-03-31 ENCOUNTER — APPOINTMENT (OUTPATIENT)
Dept: SURGERY | Facility: CLINIC | Age: 72
End: 2022-03-31
Payer: COMMERCIAL

## 2022-03-31 DIAGNOSIS — L72.0 EPIDERMAL CYST: ICD-10-CM

## 2022-03-31 DIAGNOSIS — L08.9 EPIDERMAL CYST: ICD-10-CM

## 2022-03-31 PROCEDURE — 99024 POSTOP FOLLOW-UP VISIT: CPT

## 2022-10-04 ENCOUNTER — OUTPATIENT (OUTPATIENT)
Dept: OUTPATIENT SERVICES | Facility: HOSPITAL | Age: 72
LOS: 1 days | Discharge: HOME | End: 2022-10-04
Payer: COMMERCIAL

## 2022-10-04 ENCOUNTER — RESULT REVIEW (OUTPATIENT)
Age: 72
End: 2022-10-04

## 2022-10-04 VITALS
SYSTOLIC BLOOD PRESSURE: 145 MMHG | HEIGHT: 71 IN | DIASTOLIC BLOOD PRESSURE: 69 MMHG | WEIGHT: 244.93 LBS | OXYGEN SATURATION: 98 % | HEART RATE: 63 BPM | RESPIRATION RATE: 12 BRPM | TEMPERATURE: 97 F

## 2022-10-04 DIAGNOSIS — Z98.890 OTHER SPECIFIED POSTPROCEDURAL STATES: Chronic | ICD-10-CM

## 2022-10-04 DIAGNOSIS — K22.70 BARRETT'S ESOPHAGUS WITHOUT DYSPLASIA: ICD-10-CM

## 2022-10-04 DIAGNOSIS — K21.9 GASTRO-ESOPHAGEAL REFLUX DISEASE WITHOUT ESOPHAGITIS: ICD-10-CM

## 2022-10-04 DIAGNOSIS — Z90.89 ACQUIRED ABSENCE OF OTHER ORGANS: Chronic | ICD-10-CM

## 2022-10-04 PROCEDURE — 88313 SPECIAL STAINS GROUP 2: CPT

## 2022-10-04 PROCEDURE — 88305 TISSUE EXAM BY PATHOLOGIST: CPT

## 2022-10-04 PROCEDURE — 88313 SPECIAL STAINS GROUP 2: CPT | Mod: 26

## 2022-10-04 PROCEDURE — 88312 SPECIAL STAINS GROUP 1: CPT

## 2022-10-04 PROCEDURE — 88305 TISSUE EXAM BY PATHOLOGIST: CPT | Mod: 26

## 2022-10-04 PROCEDURE — 88312 SPECIAL STAINS GROUP 1: CPT | Mod: 26

## 2022-10-04 RX ORDER — FINASTERIDE 5 MG/1
1 TABLET, FILM COATED ORAL
Qty: 0 | Refills: 0 | DISCHARGE

## 2022-10-04 RX ORDER — ATORVASTATIN CALCIUM 80 MG/1
1 TABLET, FILM COATED ORAL
Qty: 0 | Refills: 0 | DISCHARGE

## 2022-10-04 RX ORDER — LABETALOL HCL 100 MG
1 TABLET ORAL
Qty: 0 | Refills: 0 | DISCHARGE

## 2022-10-04 RX ORDER — OMEPRAZOLE 10 MG/1
1 CAPSULE, DELAYED RELEASE ORAL
Qty: 0 | Refills: 0 | DISCHARGE

## 2022-10-04 RX ORDER — ASPIRIN/CALCIUM CARB/MAGNESIUM 324 MG
1 TABLET ORAL
Qty: 0 | Refills: 0 | DISCHARGE

## 2022-10-04 RX ORDER — DILTIAZEM HCL 120 MG
1 CAPSULE, EXT RELEASE 24 HR ORAL
Qty: 0 | Refills: 0 | DISCHARGE

## 2022-10-04 RX ORDER — LOSARTAN POTASSIUM 100 MG/1
1 TABLET, FILM COATED ORAL
Qty: 0 | Refills: 0 | DISCHARGE

## 2022-10-04 NOTE — ASU PATIENT PROFILE, ADULT - PREOP PAIN SCORE
0800: Bedside and Verbal shift change report given to FROY Cortés RN (oncoming nurse) by ARNAV Valentin RN (offgoing nurse). Report included the following information SBAR, Intake/Output, MAR and Recent Results.
Bedside shift change report given to Nativoo RN (oncoming nurse) by Jalen Valentine RN (offgoing nurse). Report included the following information SBAR, Kardex, Intake/Output, MAR and Recent Results.
sbar report from CLIFFORD Velasquez RN
0

## 2022-10-04 NOTE — ASU PATIENT PROFILE, ADULT - NSICDXPASTSURGICALHX_GEN_ALL_CORE_FT
PAST SURGICAL HISTORY:  H/O cystostomy     History of prostate biopsy     S/P biopsy     S/P tonsillectomy

## 2022-10-04 NOTE — ASU PATIENT PROFILE, ADULT - FALL HARM RISK - UNIVERSAL INTERVENTIONS
Bed in lowest position, wheels locked, appropriate side rails in place/Call bell, personal items and telephone in reach/Instruct patient to call for assistance before getting out of bed or chair/Non-slip footwear when patient is out of bed/[place] to call system/Physically safe environment - no spills, clutter or unnecessary equipment/Purposeful Proactive Rounding/Room/bathroom lighting operational, light cord in reach

## 2022-10-04 NOTE — ASU PATIENT PROFILE, ADULT - NSICDXPASTMEDICALHX_GEN_ALL_CORE_FT
PAST MEDICAL HISTORY:  DM (diabetes mellitus)     Heartburn     HLD (hyperlipidemia)     HTN (hypertension)     Skin cancer

## 2022-10-06 DIAGNOSIS — K57.30 DIVERTICULOSIS OF LARGE INTESTINE WITHOUT PERFORATION OR ABSCESS WITHOUT BLEEDING: ICD-10-CM

## 2022-10-06 DIAGNOSIS — K29.50 UNSPECIFIED CHRONIC GASTRITIS WITHOUT BLEEDING: ICD-10-CM

## 2022-10-06 DIAGNOSIS — Z12.11 ENCOUNTER FOR SCREENING FOR MALIGNANT NEOPLASM OF COLON: ICD-10-CM

## 2022-10-06 DIAGNOSIS — E11.9 TYPE 2 DIABETES MELLITUS WITHOUT COMPLICATIONS: ICD-10-CM

## 2022-10-06 DIAGNOSIS — K21.00 GASTRO-ESOPHAGEAL REFLUX DISEASE WITH ESOPHAGITIS, WITHOUT BLEEDING: ICD-10-CM

## 2022-10-06 DIAGNOSIS — Z79.82 LONG TERM (CURRENT) USE OF ASPIRIN: ICD-10-CM

## 2022-10-06 DIAGNOSIS — D12.0 BENIGN NEOPLASM OF CECUM: ICD-10-CM

## 2022-10-06 DIAGNOSIS — K31.89 OTHER DISEASES OF STOMACH AND DUODENUM: ICD-10-CM

## 2022-10-06 DIAGNOSIS — D53.9 NUTRITIONAL ANEMIA, UNSPECIFIED: ICD-10-CM

## 2022-10-06 DIAGNOSIS — Z86.010 PERSONAL HISTORY OF COLONIC POLYPS: ICD-10-CM

## 2022-10-06 DIAGNOSIS — E78.5 HYPERLIPIDEMIA, UNSPECIFIED: ICD-10-CM

## 2022-10-06 DIAGNOSIS — D12.3 BENIGN NEOPLASM OF TRANSVERSE COLON: ICD-10-CM

## 2022-10-06 DIAGNOSIS — I10 ESSENTIAL (PRIMARY) HYPERTENSION: ICD-10-CM

## 2023-01-19 ENCOUNTER — RX RENEWAL (OUTPATIENT)
Age: 73
End: 2023-01-19

## 2023-01-27 ENCOUNTER — RX RENEWAL (OUTPATIENT)
Age: 73
End: 2023-01-27

## 2023-05-31 ENCOUNTER — RX RENEWAL (OUTPATIENT)
Age: 73
End: 2023-05-31

## 2023-10-18 ENCOUNTER — RX RENEWAL (OUTPATIENT)
Age: 73
End: 2023-10-18

## 2024-04-09 ENCOUNTER — OFFICE (OUTPATIENT)
Dept: URBAN - METROPOLITAN AREA CLINIC 102 | Facility: CLINIC | Age: 74
Setting detail: OPHTHALMOLOGY
End: 2024-04-09
Payer: COMMERCIAL

## 2024-04-09 DIAGNOSIS — H25.13: ICD-10-CM

## 2024-04-09 DIAGNOSIS — H35.3131: ICD-10-CM

## 2024-04-09 DIAGNOSIS — H35.363: ICD-10-CM

## 2024-04-09 DIAGNOSIS — H40.033: ICD-10-CM

## 2024-04-09 PROBLEM — H52.7 REFRACTIVE ERROR: Status: ACTIVE | Noted: 2024-04-09

## 2024-04-09 PROCEDURE — 92020 GONIOSCOPY: CPT | Performed by: OPHTHALMOLOGY

## 2024-04-09 PROCEDURE — 92004 COMPRE OPH EXAM NEW PT 1/>: CPT | Performed by: OPHTHALMOLOGY

## 2024-04-09 PROCEDURE — 92250 FUNDUS PHOTOGRAPHY W/I&R: CPT | Performed by: OPHTHALMOLOGY

## 2024-06-27 ENCOUNTER — OFFICE (OUTPATIENT)
Dept: URBAN - METROPOLITAN AREA CLINIC 102 | Facility: CLINIC | Age: 74
Setting detail: OPHTHALMOLOGY
End: 2024-06-27
Payer: COMMERCIAL

## 2024-06-27 DIAGNOSIS — H53.9: ICD-10-CM

## 2024-06-27 DIAGNOSIS — H40.033: ICD-10-CM

## 2024-06-27 DIAGNOSIS — H25.13: ICD-10-CM

## 2024-06-27 DIAGNOSIS — H35.3131: ICD-10-CM

## 2024-06-27 DIAGNOSIS — H35.363: ICD-10-CM

## 2024-06-27 PROCEDURE — 92083 EXTENDED VISUAL FIELD XM: CPT | Performed by: OPHTHALMOLOGY

## 2024-06-27 PROCEDURE — 92133 CPTRZD OPH DX IMG PST SGM ON: CPT | Performed by: OPHTHALMOLOGY

## 2024-06-27 PROCEDURE — 76514 ECHO EXAM OF EYE THICKNESS: CPT | Performed by: OPHTHALMOLOGY

## 2024-06-27 PROCEDURE — 92012 INTRM OPH EXAM EST PATIENT: CPT | Performed by: OPHTHALMOLOGY

## 2024-06-27 ASSESSMENT — CONFRONTATIONAL VISUAL FIELD TEST (CVF)
OD_FINDINGS: FULL
OS_FINDINGS: FULL

## 2024-08-08 ENCOUNTER — RX ONLY (RX ONLY)
Age: 74
End: 2024-08-08

## 2024-08-08 ENCOUNTER — OFFICE (OUTPATIENT)
Dept: URBAN - METROPOLITAN AREA CLINIC 102 | Facility: CLINIC | Age: 74
Setting detail: OPHTHALMOLOGY
End: 2024-08-08
Payer: COMMERCIAL

## 2024-08-08 DIAGNOSIS — H53.9: ICD-10-CM

## 2024-08-08 DIAGNOSIS — H25.13: ICD-10-CM

## 2024-08-08 DIAGNOSIS — H35.363: ICD-10-CM

## 2024-08-08 DIAGNOSIS — H40.033: ICD-10-CM

## 2024-08-08 DIAGNOSIS — H35.3131: ICD-10-CM

## 2024-08-08 PROCEDURE — 99213 OFFICE O/P EST LOW 20 MIN: CPT | Performed by: OPHTHALMOLOGY

## 2024-08-08 ASSESSMENT — CONFRONTATIONAL VISUAL FIELD TEST (CVF)
OD_FINDINGS: FULL
OS_FINDINGS: FULL

## 2025-02-11 ENCOUNTER — OFFICE (OUTPATIENT)
Dept: URBAN - METROPOLITAN AREA CLINIC 102 | Facility: CLINIC | Age: 75
Setting detail: OPHTHALMOLOGY
End: 2025-02-11
Payer: COMMERCIAL

## 2025-02-11 DIAGNOSIS — H35.3131: ICD-10-CM

## 2025-02-11 DIAGNOSIS — H25.13: ICD-10-CM

## 2025-02-11 DIAGNOSIS — H35.363: ICD-10-CM

## 2025-02-11 DIAGNOSIS — H40.033: ICD-10-CM

## 2025-02-11 PROCEDURE — 92134 CPTRZ OPH DX IMG PST SGM RTA: CPT | Performed by: OPHTHALMOLOGY

## 2025-02-11 PROCEDURE — 92020 GONIOSCOPY: CPT | Performed by: OPHTHALMOLOGY

## 2025-02-11 PROCEDURE — 92014 COMPRE OPH EXAM EST PT 1/>: CPT | Performed by: OPHTHALMOLOGY

## 2025-02-11 PROCEDURE — 92083 EXTENDED VISUAL FIELD XM: CPT | Performed by: OPHTHALMOLOGY

## 2025-02-11 ASSESSMENT — REFRACTION_MANIFEST
OS_VA1: 20/25
OD_CYLINDER: -1.00
OS_AXIS: 172
OD_CYLINDER: -0.75
OD_AXIS: 016
OS_CYLINDER: -0.75
OD_AXIS: 011
OD_VA1: 20/25
OS_SPHERE: +1.00
OD_SPHERE: +1.75
OS_AXIS: 137
OD_SPHERE: +2.00
OS_SPHERE: 0.00
OS_CYLINDER: -0.25

## 2025-02-11 ASSESSMENT — PACHYMETRY
OD_CT_UM: 553
OD_CT_CORRECTION: -1
OS_CT_UM: 565
OS_CT_CORRECTION: -1

## 2025-02-11 ASSESSMENT — VISUAL ACUITY
OD_BCVA: 20/25+
OS_BCVA: 20/20-1

## 2025-02-11 ASSESSMENT — REFRACTION_AUTOREFRACTION
OD_CYLINDER: -0.75
OD_AXIS: 011
OS_SPHERE: 0.00
OD_SPHERE: +1.75
OS_AXIS: 137
OS_CYLINDER: -0.25

## 2025-02-11 ASSESSMENT — KERATOMETRY
METHOD_AUTO_MANUAL: AUTO
OS_K2POWER_DIOPTERS: 45.25
OD_K2POWER_DIOPTERS: 45.25
OD_AXISANGLE_DEGREES: 116
OS_AXISANGLE_DEGREES: 074
OS_K1POWER_DIOPTERS: 44.25
OD_K1POWER_DIOPTERS: 44.50

## 2025-02-11 ASSESSMENT — CONFRONTATIONAL VISUAL FIELD TEST (CVF)
OD_FINDINGS: FULL
OS_FINDINGS: FULL

## 2025-02-11 ASSESSMENT — REFRACTION_CURRENTRX
OD_OVR_VA: 20/
OD_AXIS: 180
OS_CYLINDER: -0.50
OD_CYLINDER: 0.00
OD_SPHERE: +3.50
OD_VPRISM_DIRECTION: SV
OS_AXIS: 165
OS_OVR_VA: 20/
OS_SPHERE: +2.50
OS_VPRISM_DIRECTION: SV

## 2025-02-11 ASSESSMENT — TONOMETRY
OD_IOP_MMHG: 12
OS_IOP_MMHG: 10

## 2025-08-12 ENCOUNTER — OFFICE (OUTPATIENT)
Dept: URBAN - METROPOLITAN AREA CLINIC 102 | Facility: CLINIC | Age: 75
Setting detail: OPHTHALMOLOGY
End: 2025-08-12
Payer: COMMERCIAL

## 2025-08-12 DIAGNOSIS — H35.363: ICD-10-CM

## 2025-08-12 DIAGNOSIS — H25.13: ICD-10-CM

## 2025-08-12 DIAGNOSIS — H35.3132: ICD-10-CM

## 2025-08-12 DIAGNOSIS — H40.033: ICD-10-CM

## 2025-08-12 PROCEDURE — 92134 CPTRZ OPH DX IMG PST SGM RTA: CPT | Performed by: OPHTHALMOLOGY

## 2025-08-12 PROCEDURE — 92020 GONIOSCOPY: CPT | Performed by: OPHTHALMOLOGY

## 2025-08-12 PROCEDURE — 92014 COMPRE OPH EXAM EST PT 1/>: CPT | Performed by: OPHTHALMOLOGY

## 2025-08-12 ASSESSMENT — KERATOMETRY
OD_K1POWER_DIOPTERS: 44.50
OS_AXISANGLE_DEGREES: 075
OD_K2POWER_DIOPTERS: 45.50
OS_K1POWER_DIOPTERS: 44.25
OS_K2POWER_DIOPTERS: 45.25
METHOD_AUTO_MANUAL: AUTO
OD_AXISANGLE_DEGREES: 106

## 2025-08-12 ASSESSMENT — CONFRONTATIONAL VISUAL FIELD TEST (CVF)
OS_FINDINGS: FULL
OD_FINDINGS: FULL

## 2025-08-12 ASSESSMENT — REFRACTION_MANIFEST
OD_SPHERE: +1.75
OS_AXIS: 172
OD_AXIS: 011
OD_AXIS: 016
OS_VA1: 20/25
OS_SPHERE: 0.00
OS_CYLINDER: -0.75
OD_SPHERE: +2.00
OD_CYLINDER: -0.75
OS_AXIS: 137
OD_CYLINDER: -1.00
OS_SPHERE: +1.00
OD_VA1: 20/25
OS_CYLINDER: -0.25

## 2025-08-12 ASSESSMENT — VISUAL ACUITY
OS_BCVA: 20/20-2
OD_BCVA: 20/25+1

## 2025-08-12 ASSESSMENT — REFRACTION_AUTOREFRACTION
OS_CYLINDER: SPH
OS_AXIS: 000
OD_AXIS: 004
OS_SPHERE: -0.50
OD_SPHERE: +1.75
OD_CYLINDER: -1.25

## 2025-08-12 ASSESSMENT — REFRACTION_CURRENTRX
OD_VPRISM_DIRECTION: SV
OD_CYLINDER: 0.00
OD_AXIS: 180
OD_SPHERE: +3.50
OS_AXIS: 165
OS_SPHERE: +2.50
OD_OVR_VA: 20/
OS_VPRISM_DIRECTION: SV
OS_CYLINDER: -0.50
OS_OVR_VA: 20/

## 2025-08-12 ASSESSMENT — PACHYMETRY
OS_CT_UM: 565
OD_CT_UM: 553
OD_CT_CORRECTION: -1
OS_CT_CORRECTION: -1

## 2025-08-12 ASSESSMENT — TONOMETRY
OD_IOP_MMHG: 12
OS_IOP_MMHG: 12
OS_IOP_MMHG: 11
OD_IOP_MMHG: 12